# Patient Record
Sex: FEMALE | Race: WHITE | NOT HISPANIC OR LATINO
[De-identification: names, ages, dates, MRNs, and addresses within clinical notes are randomized per-mention and may not be internally consistent; named-entity substitution may affect disease eponyms.]

---

## 2017-10-17 ENCOUNTER — APPOINTMENT (RX ONLY)
Dept: TELEHEALTH 2 | Facility: TELEHEALTH | Age: 37
Setting detail: DERMATOLOGY
End: 2017-10-17

## 2017-10-17 DIAGNOSIS — Z00.00 ENCOUNTER FOR GENERAL ADULT MEDICAL EXAMINATION WITHOUT ABNORMAL FINDINGS: ICD-10-CM

## 2017-10-17 PROBLEM — Z71.1 PERSON WITH FEARED HEALTH COMPLAINT IN WHOM NO DIAGNOSIS IS MADE: Status: ACTIVE | Noted: 2017-10-17

## 2017-10-17 PROCEDURE — ? OBSERVATION

## 2017-10-17 PROCEDURE — 99444: CPT

## 2017-10-17 PROCEDURE — ? COUNSELING

## 2017-10-17 ASSESSMENT — LOCATION SIMPLE DESCRIPTION DERM: LOCATION SIMPLE: LEFT POSTERIOR UPPER ARM

## 2017-10-17 ASSESSMENT — LOCATION ZONE DERM: LOCATION ZONE: ARM

## 2017-10-17 ASSESSMENT — LOCATION DETAILED DESCRIPTION DERM: LOCATION DETAILED: LEFT DISTAL POSTERIOR UPPER ARM

## 2017-10-17 NOTE — HPI: DISCOLORATION (TELEMEDICINE)
How Severe Is Your Skin Discoloration?: mild
Additional Comments (Use Complete Sentences): This spot is difficult to see but I noticed it a few months ago, and have been keeping my eye on it. It hasn’t changed noticeably but when I am exposed to sun (with sunblock or not), it is easier to notice. The spot has a slight “dry” look to it, and I notice what might be a very faint freckle on the upper left portion. The skin pattern (the normal cracks/creases that make skin flexible) is larger on this spot, as if the “panels” that make up the skin are larger than the surrounding patterning. The spot is 3/16” at its widest, and 1/4” at it’s tallest, with a squareish, roundish shape.